# Patient Record
Sex: FEMALE | Race: WHITE | NOT HISPANIC OR LATINO | Employment: PART TIME | URBAN - METROPOLITAN AREA
[De-identification: names, ages, dates, MRNs, and addresses within clinical notes are randomized per-mention and may not be internally consistent; named-entity substitution may affect disease eponyms.]

---

## 2017-12-14 ENCOUNTER — EXTERNAL ORDER RESULTS (OUTPATIENT)
Dept: SURGERY | Facility: CLINIC | Age: 47
End: 2017-12-14

## 2017-12-18 ENCOUNTER — TELEPHONE (OUTPATIENT)
Dept: SURGERY | Facility: CLINIC | Age: 47
End: 2017-12-18

## 2017-12-18 DIAGNOSIS — K55.9 ISCHEMIC COLITIS (H): Primary | ICD-10-CM

## 2017-12-18 NOTE — PATIENT INSTRUCTIONS
CT CHEST ABDOMEN AND PELVIS WITH CONTRAST     Date: 12-20-17  Time: 2:45 PM   Location: [facility]  [address]  [place]  [address]        Please check in at 2:30 PM       Preparation for CT scanning      Do not eat or drink anything TWO hours prior to your exam except for prep:    Mix all the liquid from the bottle Omnipaque 140 (50mL) with 24 ounces of water.     Drink 1/2 of the mixture at:  12:45 PM (two hours before)  Drink the rest of the mixture at:  1:45 PM  (one hour before)    Please bring an updated list of all your medications, including IV Chemo Therapy over the counter and herbal supplements.    For questions regarding your test please call [phone].   If you are taking any medications and you have questions, please call your primary care physician.

## 2017-12-20 ENCOUNTER — HOSPITAL ENCOUNTER (EMERGENCY)
Facility: CLINIC | Age: 47
Discharge: HOME | End: 2017-12-20
Attending: EMERGENCY MEDICINE | Admitting: EMERGENCY MEDICINE
Payer: COMMERCIAL

## 2017-12-20 VITALS
RESPIRATION RATE: 18 BRPM | TEMPERATURE: 98.2 F | WEIGHT: 110 LBS | HEIGHT: 66 IN | OXYGEN SATURATION: 100 % | DIASTOLIC BLOOD PRESSURE: 83 MMHG | BODY MASS INDEX: 17.68 KG/M2 | SYSTOLIC BLOOD PRESSURE: 110 MMHG | HEART RATE: 89 BPM

## 2017-12-20 DIAGNOSIS — R53.1 GENERALIZED WEAKNESS: ICD-10-CM

## 2017-12-20 LAB
ALBUMIN SERPL-MCNC: 4.1 G/DL (ref 3.4–5)
ALBUMIN UR-MCNC: NEGATIVE MG/DL
ALP SERPL-CCNC: 54 U/L (ref 40–150)
ALT SERPL W P-5'-P-CCNC: 24 U/L (ref 0–50)
ANION GAP SERPL CALCULATED.3IONS-SCNC: 8 MMOL/L (ref 3–14)
APPEARANCE UR: ABNORMAL
AST SERPL W P-5'-P-CCNC: 13 U/L (ref 0–45)
BASOPHILS # BLD AUTO: 0.1 10E9/L (ref 0–0.2)
BASOPHILS NFR BLD AUTO: 1.2 %
BILIRUB SERPL-MCNC: 0.5 MG/DL (ref 0.2–1.3)
BILIRUB UR QL STRIP: NEGATIVE
BUN SERPL-MCNC: 11 MG/DL (ref 7–30)
CALCIUM SERPL-MCNC: 9.1 MG/DL (ref 8.5–10.1)
CHLORIDE SERPL-SCNC: 104 MMOL/L (ref 94–109)
CO2 SERPL-SCNC: 27 MMOL/L (ref 20–32)
COLOR UR AUTO: YELLOW
CREAT SERPL-MCNC: 0.76 MG/DL (ref 0.52–1.04)
DIFFERENTIAL METHOD BLD: NORMAL
EOSINOPHIL # BLD AUTO: 0.1 10E9/L (ref 0–0.7)
EOSINOPHIL NFR BLD AUTO: 1.6 %
ERYTHROCYTE [DISTWIDTH] IN BLOOD BY AUTOMATED COUNT: 12.3 % (ref 10–15)
FLUAV+FLUBV AG SPEC QL: NEGATIVE
FLUAV+FLUBV AG SPEC QL: NEGATIVE
GFR SERPL CREATININE-BSD FRML MDRD: 80 ML/MIN/1.7M2
GLUCOSE SERPL-MCNC: 88 MG/DL (ref 70–99)
GLUCOSE UR STRIP-MCNC: NEGATIVE MG/DL
HCT VFR BLD AUTO: 42.3 % (ref 35–47)
HGB BLD-MCNC: 14.5 G/DL (ref 11.7–15.7)
HGB UR QL STRIP: NEGATIVE
IMM GRANULOCYTES # BLD: 0 10E9/L (ref 0–0.4)
IMM GRANULOCYTES NFR BLD: 0.3 %
KETONES UR STRIP-MCNC: NEGATIVE MG/DL
LACTATE BLD-SCNC: 1.1 MMOL/L (ref 0.7–2)
LEUKOCYTE ESTERASE UR QL STRIP: NEGATIVE
LIPASE SERPL-CCNC: 146 U/L (ref 73–393)
LYMPHOCYTES # BLD AUTO: 0.8 10E9/L (ref 0.8–5.3)
LYMPHOCYTES NFR BLD AUTO: 13.7 %
MAGNESIUM SERPL-MCNC: 2.6 MG/DL (ref 1.6–2.3)
MCH RBC QN AUTO: 31 PG (ref 26.5–33)
MCHC RBC AUTO-ENTMCNC: 34.3 G/DL (ref 31.5–36.5)
MCV RBC AUTO: 91 FL (ref 78–100)
MONOCYTES # BLD AUTO: 0.4 10E9/L (ref 0–1.3)
MONOCYTES NFR BLD AUTO: 6.9 %
MUCOUS THREADS #/AREA URNS LPF: PRESENT /LPF
NEUTROPHILS # BLD AUTO: 4.6 10E9/L (ref 1.6–8.3)
NEUTROPHILS NFR BLD AUTO: 76.3 %
NITRATE UR QL: NEGATIVE
NRBC # BLD AUTO: 0 10*3/UL
NRBC BLD AUTO-RTO: 0 /100
PH UR STRIP: 7 PH (ref 5–7)
PLATELET # BLD AUTO: 282 10E9/L (ref 150–450)
POTASSIUM SERPL-SCNC: 4.2 MMOL/L (ref 3.4–5.3)
PROT SERPL-MCNC: 8.2 G/DL (ref 6.8–8.8)
RBC # BLD AUTO: 4.67 10E12/L (ref 3.8–5.2)
RBC #/AREA URNS AUTO: 0 /HPF (ref 0–2)
SODIUM SERPL-SCNC: 139 MMOL/L (ref 133–144)
SOURCE: ABNORMAL
SP GR UR STRIP: 1.01 (ref 1–1.03)
SPECIMEN SOURCE: NORMAL
SQUAMOUS #/AREA URNS AUTO: 1 /HPF (ref 0–1)
TROPONIN I SERPL-MCNC: <0.015 UG/L (ref 0–0.04)
UROBILINOGEN UR STRIP-MCNC: 0 MG/DL (ref 0–2)
WBC # BLD AUTO: 6.1 10E9/L (ref 4–11)
WBC #/AREA URNS AUTO: 0 /HPF (ref 0–2)

## 2017-12-20 PROCEDURE — 96360 HYDRATION IV INFUSION INIT: CPT

## 2017-12-20 PROCEDURE — 83605 ASSAY OF LACTIC ACID: CPT | Performed by: EMERGENCY MEDICINE

## 2017-12-20 PROCEDURE — 80053 COMPREHEN METABOLIC PANEL: CPT | Performed by: EMERGENCY MEDICINE

## 2017-12-20 PROCEDURE — 83735 ASSAY OF MAGNESIUM: CPT | Performed by: EMERGENCY MEDICINE

## 2017-12-20 PROCEDURE — 83690 ASSAY OF LIPASE: CPT | Performed by: EMERGENCY MEDICINE

## 2017-12-20 PROCEDURE — 96361 HYDRATE IV INFUSION ADD-ON: CPT

## 2017-12-20 PROCEDURE — 25000128 H RX IP 250 OP 636: Performed by: EMERGENCY MEDICINE

## 2017-12-20 PROCEDURE — 87804 INFLUENZA ASSAY W/OPTIC: CPT | Performed by: EMERGENCY MEDICINE

## 2017-12-20 PROCEDURE — 93005 ELECTROCARDIOGRAM TRACING: CPT

## 2017-12-20 PROCEDURE — 81001 URINALYSIS AUTO W/SCOPE: CPT | Performed by: EMERGENCY MEDICINE

## 2017-12-20 PROCEDURE — 84484 ASSAY OF TROPONIN QUANT: CPT | Performed by: EMERGENCY MEDICINE

## 2017-12-20 PROCEDURE — 99284 EMERGENCY DEPT VISIT MOD MDM: CPT | Mod: 25

## 2017-12-20 PROCEDURE — 85025 COMPLETE CBC W/AUTO DIFF WBC: CPT | Performed by: EMERGENCY MEDICINE

## 2017-12-20 RX ORDER — SELENIUM 50 MCG
1 TABLET ORAL DAILY
COMMUNITY

## 2017-12-20 RX ADMIN — SODIUM CHLORIDE 1000 ML: 9 INJECTION, SOLUTION INTRAVENOUS at 12:58

## 2017-12-20 ASSESSMENT — ENCOUNTER SYMPTOMS
FEVER: 0
HEADACHES: 0
BLOOD IN STOOL: 0
PALPITATIONS: 0
COUGH: 0
FREQUENCY: 0
DIARRHEA: 0
VOMITING: 0
CONFUSION: 0
WEAKNESS: 0
NAUSEA: 1
FATIGUE: 1
ABDOMINAL PAIN: 0
NUMBNESS: 0
DYSURIA: 0
HEMATURIA: 0
SHORTNESS OF BREATH: 1
DIZZINESS: 0

## 2017-12-20 NOTE — ED PROVIDER NOTES
History     Chief Complaint:  Generalized Weakness; Fatigue    HPI   Alea Davalos is an otherwise healthy 47 year old female who presents for evaluation of generalized weakness and fatigue. The patient underwent colonoscopy on 12/14/2017 at [facility] for evaluation of bloody stools. When she woke up from the procedure she felt dizzy, nauseous, diaphoretic, and developed heart palpitations. She was sent to [facility] ED where she was found to be in atrial flutter. This resolved spontaneously in the ED and she was discharged to home without anticoagulation. Her colonoscopy pathology was consistent with ischemic colitis, so she has been following with both GI and cardiology. The patient reports she has continued to feel nauseous, generally weak, and fatigued since her procedure. Symptoms seemed worse today, prompting her to come to the ED for evaluation. Of note, she is scheduled for CT CAP today with GI as well as stress echo tomorrow for cardiology. On arrival, patient notes nausea, fatigue, and weakness and states she hasn't had much of an appetite. She states she feels fatigued and slightly short of breath with exertion. She denies any fevers, chills, palpitations, chest pain, abdominal pain, headache, urinary symptoms, rash, vision changes, or any further diarrhea or bloody stools. She has been trying to drink fluids at home but hasn't been drinking as much as usual.     Allergies:  No Known Allergies     Medications:    ranitidine (RANITIDINE) 75 MG tablet  Lactobacillus (ACIDOPHILUS) CAPS  calcium-vitamin D (CALTRATE) 600-400 MG-UNIT per tablet  multivitamin, therapeutic with minerals (MULTI-VITAMIN) TABS    Past Medical History:   Migraines    Past Surgical History:    Laparotomy for endometriosis  [place] teeth extraction    Family History:    Prolapsed heart valve  Father CAD in 70s or 80s    Social History:  Smoking status: Never smoker  Alcohol use: Yes, occasional   Presents to the ED  "with her  and sister  Marital Status:   [2]     Review of Systems   Constitutional: Positive for fatigue. Negative for fever.        Generalized weakness   Eyes: Negative for visual disturbance.   Respiratory: Positive for shortness of breath. Negative for cough.    Cardiovascular: Negative for chest pain, palpitations and leg swelling.   Gastrointestinal: Positive for nausea. Negative for abdominal pain, blood in stool, diarrhea and vomiting.   Genitourinary: Negative for dysuria, frequency and hematuria.   Skin: Negative for rash.   Neurological: Negative for dizziness, weakness, numbness and headaches.   Psychiatric/Behavioral: Negative for confusion.   All other systems reviewed and are negative.      [facility]   BP Temp Temp src Pulse Resp SpO2 Height Weight   12/20/17 1430 110/83 - - - - 100 % - -   12/20/17 1415 111/72 - - - - 100 % - -   12/20/17 1345 115/83 - - - - 99 % - -   12/20/17 1300 105/80 - - - - 97 % - -   12/20/17 1131 118/80 98.2  F (36.8  C) Temporal 89 18 96 % 1.676 m (5' 6\") 49.9 kg (110 lb)       Physical Exam  Constitutional: Well developed, nontox appearance  Head: Atraumatic.   Mouth/Throat: Oropharynx is clear and moist.   Neck:  no stridor  Eyes: no scleral icterus  Cardiovascular: RRR, 2+ bilat radial pulses  Pulmonary/Chest: nml resp effort, Clear BS bilat  Abdominal: ND, +BS, soft, NT, no rebound or guarding   : no CVA tenderness bilat  Ext: Warm, well perfused, no edema  Neurological: A&O, symmetric facies, moves ext x4  Skin: Skin is warm and dry.   Psychiatric: Behavior is normal. Thought content normal.   Nursing note and vitals reviewed.    Emergency Department Course   ECG (11:16:27):  Rate 72 bpm. WA interval 112. QRS duration 72. QT/QTc 336/367. P-R-T axes 51 79 -27. Normal sinus rhythm. Nonspecific T wave abnormality. Abnormal ECG   Interpreted at 1130 by Yassine Almodovar MD.    Laboratory:  Troponin: " <0.015  CBC: WNL (WBC 6.1, HGB 14.5, )   CMP: WNL (Creatinine 0.76)  Magnesium: 2.6(H)  Lipase: 146  Lactic acid: 1.1  UA: Mucous present, o/w negative    Interventions:  1258: NS 1L IV Bolus    Emergency Department Course:  Past medical records, nursing notes, and vitals reviewed.  1158: I performed an exam of the patient and obtained history, as documented above.  IV inserted and blood drawn.  ECG obtained, results above.     1357: I rechecked the patient. Explained findings to the patient.    I rechecked the patient.  Findings and plan explained to the Patient. Patient discharged home with instructions regarding supportive care, medications, and reasons to return. The importance of close follow-up was reviewed.   Impression & Plan      Medical Decision Making:  Alea Davalos is a 47 year old female presenting with generalized weakness.     Differential diagnosis includes electrolyte abnormality, dehydration, UTI, influenza, anxiety. Labs ordered as noted above and unremarkable. ECG read as noted above and no evidence of arrhythmia. The patient was given fluids in the Emergency Department. She reports that she basically feels the same. Patient has extensive outpatient testing scheduled for the next few days including CT chest abdomen and pelvis, stress echo, and follow up with her cardiologist in 2 days. I think she is safe for discharge at this point given her vital signs and negative work up.  Doubt ACS, infection, CVA. She is encouraged to continue PO intake to the best of her ability and follow up with her primary care doctor next week. She was counseled on the results, diagnosis, and disposition. She is understanding and agreeable to plan. She is subsequently discharged in stable condition.     Diagnosis:    ICD-10-CM   1. Generalized weakness R53.1     Disposition: Discharged to home    Mary Lou Alonso  12/20/2017   [facility] EMERGENCY DEPARTMENT    Mary Lou SALAZAR am  serving as a scribe at 11:58 AM on 12/20/2017 to document services personally performed by Yassine Almodovar MD based on my observations and the provider's statements to me.        Yassine Almodovar MD  12/21/17 0225

## 2017-12-20 NOTE — ED AVS SNAPSHOT
[facility] Emergency Department    [address]    [facility] [address]    Phone:  [phone]    Fax:  [phone]                                       Alea Davalos   MRN: [id number]    Department:  [facility] Emergency Department   Date of Visit:  12/20/2017           After Visit Summary Signature Page     I have received my discharge instructions, and my questions have been answered. I have discussed any challenges I see with this plan with the nurse or doctor.    ..........................................................................................................................................  Patient/Patient Representative Signature      ..........................................................................................................................................  Patient Representative Print Name and Relationship to Patient    ..................................................               ................................................  Date                                            Time    ..........................................................................................................................................  Reviewed by Signature/Title    ...................................................              ..............................................  Date                                                            Time

## 2017-12-20 NOTE — DISCHARGE INSTRUCTIONS
Please follow-up with your scheduled studies.  Please follow-up with your primary doctor next week as needed  Please follow-up with Cardiology as previously scheduled.  Please return to the ED as needed for new or worsening symptoms such as fainting, difficulty breathing, vomiting and unable to keep anything, fever greater than 100.4 F, any other concerning symptoms.

## 2017-12-20 NOTE — ED NOTES
ABCs intact. Pt had a colonoscopy on 12/14 d/t rectal bleeding. Pt states she was found to have ischemic colitis. Pt states she had an adverse reaction to fentanyl and versed, pt c/o nausea and vomited and pt's hr went into atrial flutter. Pt went to [employer] and her heart rhythm converted itself. Pt has a follow up appointment and a CT chest/abdomen/pelvis today. Pt c/o feeling more weak, decreased intake and appetite since procedure.

## 2017-12-20 NOTE — ED AVS SNAPSHOT
[facility] Emergency Department    [address] [facility] [address]    Phone:  [phone]    Fax:  [phone]                                       Alea Davalos   MRN: [id number]    Department:  [facility] Emergency Department   Date of Visit:  12/20/2017           Patient Information     Date Of Birth          1970        Your diagnoses for this visit were:     Generalized weakness        You were seen by Yassine Almodovar MD.      Follow-up Information     [facility]    Contact information:    [address]          Follow up with [facility] Emergency Department.    Specialty:  EMERGENCY MEDICINE    Why:  As needed    Contact information:    [address]  [facility] [address] [phone]        Discharge Instructions       Please follow-up with your scheduled studies.  Please follow-up with your primary doctor next week as needed  Please follow-up with Cardiology as previously scheduled.  Please return to the ED as needed for new or worsening symptoms such as fainting, difficulty breathing, vomiting and unable to keep anything, fever greater than 100.4 F, any other concerning symptoms.     Future Appointments        Provider Department Dept Phone Center    12/22/2017 8:45 AM Jolly Trinh MD [facility] [phone] [facility]      24 Hour Appointment Hotline       To make an appointment at any [facility], call [phone] ([phone]). If you don't have a family doctor or clinic, we will help you find one. [facility] are conveniently located to serve the needs of you and your family.             Review of your medicines      Our records show that you are taking the medicines listed below. If these are incorrect, please call your family doctor or clinic.        Dose / Directions Last dose taken    acidophilus Caps    Dose:  1 tablet        Take 1 tablet by mouth daily   Refills:  0        calcium-vitamin D 600-400 MG-UNIT per tablet   Commonly known as:  CALTRATE   Dose:  1 tablet        Take 1 tablet by mouth 2 times daily   Refills:  0        Multi-vitamin Tabs tablet   Dose:  1 tablet        Take 1 tablet by mouth daily   Refills:  0        ranitidine 75 MG tablet   Dose:  1 tablet   Generic drug:  ranitidine        Take 1 tablet by mouth as needed   Refills:  0                Procedures and tests performed during your visit     CBC with platelets differential    Comprehensive metabolic panel    EKG 12 lead    Lactic acid whole blood    Lipase    Magnesium    Troponin I    UA with Microscopic      Orders Needing Specimen Collection     Ordered          12/20/17 1354  Influenza A/B antigen - STAT, [facility]: STAT, Needs to be Collected     Scheduled Task Status   12/20/17 1359 Collect Influenza A/B antigen Open   Order Class:  PCU Collect                  Pending Results     No orders found from 12/18/2017 to 12/21/2017.            Pending Culture Results     No orders found from 12/18/2017 to 12/21/2017.            Pending Results Instructions     If you had any lab results that were not finalized at the time of your Discharge, you can call the ED Lab Result RN at [phone]. You will be contacted by this team for any positive Lab results or changes in treatment. The nurses are available 7 days a week from 10A to 6:30P.  You can leave a message 24 hours per day and they will return your call.        Test Results From Your Hospital Stay        12/20/2017 12:51 PM      Component Results     Component Value Ref Range & Units Status    Sodium 139 133 - 144 mmol/L Final    Potassium 4.2 3.4 - 5.3 mmol/L Final    Chloride 104 94 - 109 mmol/L Final    Carbon Dioxide 27 20 - 32 mmol/L Final    Anion Gap 8 3 - 14 mmol/L Final    Glucose 88 70 - 99 mg/dL Final    Urea Nitrogen 11 7 - 30 mg/dL Final    Creatinine 0.76 0.52 - 1.04 mg/dL  Final    GFR Estimate 80 >60 mL/min/1.7m2 Final    Non  GFR Calc    GFR Estimate If Black >90 >60 mL/min/1.7m2 Final    African American GFR Calc    Calcium 9.1 8.5 - 10.1 mg/dL Final    Bilirubin Total 0.5 0.2 - 1.3 mg/dL Final    Albumin 4.1 3.4 - 5.0 g/dL Final    Protein Total 8.2 6.8 - 8.8 g/dL Final    Alkaline Phosphatase 54 40 - 150 U/L Final    ALT 24 0 - 50 U/L Final    AST 13 0 - 45 U/L Final         12/20/2017 12:51 PM      Component Results     Component Value Ref Range & Units Status    Lipase 146 73 - 393 U/L Final         12/20/2017 12:31 PM      Component Results     Component Value Ref Range & Units Status    WBC 6.1 4.0 - 11.0 10e9/L Final    RBC Count 4.67 3.8 - 5.2 10e12/L Final    Hemoglobin 14.5 11.7 - 15.7 g/dL Final    Hematocrit 42.3 35.0 - 47.0 % Final    MCV 91 78 - 100 fl Final    MCH 31.0 26.5 - 33.0 pg Final    MCHC 34.3 31.5 - 36.5 g/dL Final    RDW 12.3 10.0 - 15.0 % Final    Platelet Count 282 150 - 450 10e9/L Final    Diff Method Automated Method  Final    % Neutrophils 76.3 % Final    % Lymphocytes 13.7 % Final    % Monocytes 6.9 % Final    % Eosinophils 1.6 % Final    % Basophils 1.2 % Final    % Immature Granulocytes 0.3 % Final    Nucleated RBCs 0 0 /100 Final    Absolute Neutrophil 4.6 1.6 - 8.3 10e9/L Final    Absolute Lymphocytes 0.8 0.8 - 5.3 10e9/L Final    Absolute Monocytes 0.4 0.0 - 1.3 10e9/L Final    Absolute Eosinophils 0.1 0.0 - 0.7 10e9/L Final    Absolute Basophils 0.1 0.0 - 0.2 10e9/L Final    Abs Immature Granulocytes 0.0 0 - 0.4 10e9/L Final    Absolute Nucleated RBC 0.0  Final         12/20/2017 12:51 PM      Component Results     Component Value Ref Range & Units Status    Troponin I ES <0.015 0.000 - 0.045 ug/L Final    The 99th percentile for upper reference range is 0.045 ug/L.  Troponin values   in the range of 0.045 - 0.120 ug/L may be associated with risks of adverse   clinical events.           12/20/2017  1:20 PM      Component Results      Component Value Ref Range & Units Status    Color Urine Yellow  Final    Appearance Urine Slightly Cloudy  Final    Glucose Urine Negative NEG^Negative mg/dL Final    Bilirubin Urine Negative NEG^Negative Final    Ketones Urine Negative NEG^Negative mg/dL Final    Specific Gravity Urine 1.013 1.003 - 1.035 Final    Blood Urine Negative NEG^Negative Final    pH Urine 7.0 5.0 - 7.0 pH Final    Protein Albumin Urine Negative NEG^Negative mg/dL Final    Urobilinogen mg/dL 0.0 0.0 - 2.0 mg/dL Final    Nitrite Urine Negative NEG^Negative Final    Leukocyte Esterase Urine Negative NEG^Negative Final    Source Midstream Urine  Final    WBC Urine 0 0 - 2 /HPF Final    RBC Urine 0 0 - 2 /HPF Final    Squamous Epithelial /HPF Urine 1 0 - 1 /HPF Final    Mucous Urine Present (A) NEG^Negative /LPF Final         12/20/2017 12:51 PM      Component Results     Component Value Ref Range & Units Status    Magnesium 2.6 (H) 1.6 - 2.3 mg/dL Final         12/20/2017 12:31 PM      Component Results     Component Value Ref Range & Units Status    Lactic Acid 1.1 0.7 - 2.0 mmol/L Final                Clinical Quality Measure: Blood Pressure Screening     Your blood pressure was checked while you were in the emergency department today. The last reading we obtained was  BP: 115/83 . Please read the guidelines below about what these numbers mean and what you should do about them.  If your systolic blood pressure (the top number) is less than 120 and your diastolic blood pressure (the bottom number) is less than 80, then your blood pressure is normal. There is nothing more that you need to do about it.  If your systolic blood pressure (the top number) is 120-139 or your diastolic blood pressure (the bottom number) is 80-89, your blood pressure may be higher than it should be. You should have your blood pressure rechecked within a year by a primary care provider.  If your systolic blood pressure (the top number) is 140 or greater or your  "diastolic blood pressure (the bottom number) is 90 or greater, you may have high blood pressure. High blood pressure is treatable, but if left untreated over time it can put you at risk for heart attack, stroke, or kidney failure. You should have your blood pressure rechecked by a primary care provider within the next 4 weeks.  If your provider in the emergency department today gave you specific instructions to follow-up with your doctor or provider even sooner than that, you should follow that instruction and not wait for up to 4 weeks for your follow-up visit.        Thank you for choosing [facility]       Thank you for choosing [facility] for your care. Our goal is always to provide you with excellent care. Hearing back from our patients is one way we can continue to improve our services. Please take a few minutes to complete the written survey that you may receive in the mail after you visit with us. Thank you!        [employer] Information     [employer] lets you send messages to your doctor, view your test results, renew your prescriptions, schedule appointments and more. To sign up, go to [email or web address] . Click on \"Log in\" on the left side of the screen, which will take you to the Welcome page. Then click on \"Sign up Now\" on the right side of the page.     You will be asked to enter the access code listed below, as well as some personal information. Please follow the directions to create your username and password.     Your access code is: [email or web address]  Expires: 3/20/2018  2:32 PM     Your access code will  in 90 days. If you need help or a new code, please call your [facility] clinic or [phone].        Care EveryWhere ID     This is your Care EveryWhere ID. This could be used by other organizations to access your [facility] medical records  [id number]        Equal Access to Services     TEJINDER WEBB AH: khari Rizvi, naldo freitas " dwight serrano ah. So [facility] [phone].    ATENCIÓN: Si habla español, tiene a lees disposición servicios gratuitos de asistencia lingüística. Llame al [phone].    We comply with applicable federal civil rights laws and [place] laws. We do not discriminate on the basis of race, color, national origin, age, disability, sex, sexual orientation, or gender identity.            After Visit Summary       This is your record. Keep this with you and show to your community pharmacist(s) and doctor(s) at your next visit.

## 2017-12-21 ENCOUNTER — HOSPITAL ENCOUNTER (OUTPATIENT)
Dept: CT IMAGING | Facility: CLINIC | Age: 47
Discharge: HOME | End: 2017-12-21
Attending: SURGERY | Admitting: SURGERY
Payer: COMMERCIAL

## 2017-12-21 DIAGNOSIS — K55.9 ISCHEMIC COLITIS (H): ICD-10-CM

## 2017-12-21 LAB — INTERPRETATION ECG - MUSE: NORMAL

## 2017-12-21 PROCEDURE — 74177 CT ABD & PELVIS W/CONTRAST: CPT

## 2017-12-21 PROCEDURE — 71260 CT THORAX DX C+: CPT

## 2017-12-21 PROCEDURE — 25000128 H RX IP 250 OP 636: Performed by: RADIOLOGY

## 2017-12-21 RX ORDER — IOPAMIDOL 755 MG/ML
500 INJECTION, SOLUTION INTRAVASCULAR ONCE
Status: COMPLETED | OUTPATIENT
Start: 2017-12-21 | End: 2017-12-21

## 2017-12-21 RX ADMIN — SODIUM CHLORIDE 54 ML: 9 INJECTION, SOLUTION INTRAVENOUS at 08:40

## 2017-12-21 RX ADMIN — IOPAMIDOL 54 ML: 755 INJECTION, SOLUTION INTRAVENOUS at 08:40

## 2017-12-22 ENCOUNTER — OFFICE VISIT (OUTPATIENT)
Dept: CARDIOLOGY | Facility: CLINIC | Age: 47
End: 2017-12-22
Payer: COMMERCIAL

## 2017-12-22 VITALS
SYSTOLIC BLOOD PRESSURE: 93 MMHG | DIASTOLIC BLOOD PRESSURE: 61 MMHG | HEIGHT: 66 IN | OXYGEN SATURATION: 94 % | HEART RATE: 89 BPM | BODY MASS INDEX: 17.68 KG/M2 | WEIGHT: 110 LBS

## 2017-12-22 DIAGNOSIS — I48.92 ATRIAL FLUTTER, PAROXYSMAL (H): Primary | ICD-10-CM

## 2017-12-22 PROCEDURE — 99204 OFFICE O/P NEW MOD 45 MIN: CPT | Performed by: INTERNAL MEDICINE

## 2017-12-22 PROCEDURE — 93000 ELECTROCARDIOGRAM COMPLETE: CPT | Performed by: INTERNAL MEDICINE

## 2017-12-22 NOTE — PROGRESS NOTES
[facility] HEART CARE [facility]      PRIMARY CARE PROVIDER:  Kat Warner MD       REASON FOR VISIT:  New diagnosis of atrial flutter in the context of ischemic colitis.      HISTORY OF PRESENT ILLNESS:    The patient is an absolutely delightful 47-year-old perimenopausal  lady who was accompanied by her , Asif, today.  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  She does not have any significant baseline medical history, is a lifelong never tobacco user and is small built with a BMI of 17.8 kg/4 m2.      1. Atrial flutter    Approximately 2 weeks ago, patient had abdominal discomfort with rectal bleeding.  To evaluate this, she underwent a colonoscopy on 12/14/2017.  For sedation, she had received fentanyl and midazolam.  During recovery, she had significant nausea, vomiting, dizziness and in this context developed tachy palpitations with heart rate of 120 BPM and ECG confirmed atrial flutter with a 3:1 block (typical flutter), rate 120 BPM, nonspecific ST-T changes.  Troponin was negative.  She went to the [facility] emergency room.  ECG at 1755 on 12/14 showed atrial flutter, but she self-converted to sinus rhythm at 1834 on the same day.        Colonoscopy and CT findings were consistent with ischemic colitis and she is recovering well.  She did not have significant drop in hemoglobin and has not required a blood transfusion or antibiotic therapy.      She has not had recurrence of tachy palpitations before or since.  There is no background medical history. Family history is significant for coronary stents in father in his 70s (ex-smoker), strokes and grandmother's in their late 70s/early 80s. The patient drinks 0-1 servings of caffeine daily, rare alcohol, walks regularly, has never used tobacco products, no over-the-counter herbal supplements or energy drinks.      Hemoglobin is 14.5, electrolytes normal, TSH normal, lipid profile is excellent.  She had a negative exercise  echocardiogram yesterday (bicycle, 7.7 METs) with a peak heart rate of 130 BPM, no arrhythmias, no ischemic changes, no valve disease.      MEDICATIONS:   1.  Ranitidine 75 mg as needed.     2.  Lactobacillus.   2.  Calcium with vitamin D supplement.   3.  Daily multivitamin.      ALLERGIES:  No known allergies.          Please see my attached note in Epic for comprehensive review of systems, medications, past medical an surgical history, social history and family history.        PHYSICAL EXAMINATION:   VITAL SIGNS:  Blood pressure 93/61, pulse 89 per minute and regular, height 1.676 meters, weight 49.9 kg (110 pounds), respiratory rate 14 per minute, saturations 94% on room air, BMI 17.8 kg/4 m2.   Please see the attached note in Epic for a detailed   CONSTITUTIONAL:  Slim body habitus, comfortable at rest.  Alert, oriented x3.   CARDIOVASCULAR:  Normal with normal heart sounds, no murmur or bruit.   RESPIRATORY:  Normal breath sounds.   ABDOMEN:  Soft, nontender, without bruit.  Active bowel sounds.   EXTREMITIES:  No edema.      DIAGNOSTIC DATA:  I reviewed her serial ECGs, CT abdomen report, exercise echocardiogram report (Care Everywhere) and labs.  As documented in HPI.      DIAGNOSES:     1.  Paroxysmal atrial flutter in the context of ischemic colitis.   2.  Counseling regarding hormone replacement therapy.      ASSESSMENT AND PLAN:   1.  I reviewed the diagnosis of atrial flutter with the help of online animation.  This is her first ever episode.  I suspect that it occurred in the perfect storm of nausea, vomiting, abdominal discomfort.  The retching probably slowed down her AV node, thereby activating the atrial flutter circuit.  In any case, she spontaneously converted to sinus rhythm.  She has a very healthy lifestyle with minimal to no caffeine or alcohol, exercises regularly.  Thyroid function is normal.  I would not recommend any preemptive beta blocker, etc., because of her low resting blood  pressure.  If this becomes a recurrent problem, she would be an ideal candidate for catheter ablation which has a success rate of approximately 95% for typical flutter. Her symptoms started with abdominal discomfort, and her CHADS-Vasc score is zero. It is highly unlikey that she had a thromboembolic event precipitating the ischemic colitis.  For now, she does not need to routinely follow up with me.  I will be happy to see her as needed and refer to Electrophysiology.       2.  She inquired hormone replacement therapy.  We had a detailed discussion about it.  It is not indicated as a preventive strategy for decreasing cardiovascular risk.  Certainly for menopausal symptoms, it can be taken at the least possible dose for the least amount of time.  The additional questions and prescription for this will be deferred to her primary provider.      It was my pleasure to visit with this patient.         KALPESH ALANIZ MD             D: 2017 09:59   T: 2017 10:46   MT: RESHMA      Name:     NAY FELIPE   MRN:      [id number]        Account:      [id number]   :      1970           Service Date: 2017      Document: [id number]

## 2017-12-22 NOTE — LETTER
12/22/2017      Kat Warner MD  [facility] [address] [facility] [address] [place] [address]    RE: Alea MEET Davalos     Dear Colleague,    I had the pleasure of seeing Alea Davalos in the [facility].    [facility] HEART CARE [facility]      PRIMARY CARE PROVIDER:  Kat Warner MD       REASON FOR VISIT:  New diagnosis of atrial flutter in the context of ischemic colitis.      The patient is an absolutely delightful 47-year-old perimenopausal  lady who was accompanied by her , Asif, today.  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  She does not have any significant baseline medical history, is a lifelong never tobacco user and is small built with a BMI of 17.8 kg/4 m2.      1. Atrial flutter    Approximately 2 weeks ago, patient had abdominal discomfort with rectal bleeding.  To evaluate this, she underwent a colonoscopy on 12/14/2017.  For sedation, she had received fentanyl and midazolam.  During recovery, she had significant nausea, vomiting, dizziness and in this context developed tachy palpitations with heart rate of 120 BPM and ECG confirmed atrial flutter with a 3:1 block (typical flutter), rate 120 BPM, nonspecific ST-T changes.  Troponin was negative.  She went to the [facility] emergency room.  ECG at 1755 on 12/14 showed atrial flutter, but she self-converted to sinus rhythm at 1834 on the same day.        Colonoscopy and CT findings were consistent with ischemic colitis and she is recovering well.  She did not have significant drop in hemoglobin and has not required a blood transfusion or antibiotic therapy.            She has not had recurrence of tachy palpitations before or since.  There is no background medical history. Family history is significant for coronary stents in father in his 70s (ex-smoker), strokes and grandmother's in their late 70s/early 80s. The patient drinks 0-1 servings of  caffeine daily, rare alcohol, walks regularly, has never used tobacco products, no over-the-counter herbal supplements or energy drinks.      Hemoglobin is 14.5, electrolytes normal, TSH normal, lipid profile is excellent.  She had a negative exercise echocardiogram yesterday (bicycle, 7.7 METs) with a peak heart rate of 130 BPM, no arrhythmias, no ischemic changes, no valve disease.      MEDICATIONS:   1.  Ranitidine 75 mg as needed.     2.  Lactobacillus.   2.  Calcium with vitamin D supplement.   3.  Daily multivitamin.      ALLERGIES:  No known allergies.          Please see my attached note in Epic for comprehensive review of systems, medications, past medical an surgical history, social history and family history.        PHYSICAL EXAMINATION:   VITAL SIGNS:  Blood pressure 93/61, pulse 89 per minute and regular, height 1.676 meters, weight 49.9 kg (110 pounds), respiratory rate 14 per minute, saturations 94% on room air, BMI 17.8 kg/4 m2.   Please see the attached note in Epic for a detailed   CONSTITUTIONAL:  Slim body habitus, comfortable at rest.  Alert, oriented x3.   CARDIOVASCULAR:  Normal with normal heart sounds, no murmur or bruit.   RESPIRATORY:  Normal breath sounds.   ABDOMEN:  Soft, nontender, without bruit.  Active bowel sounds.   EXTREMITIES:  No edema.      DIAGNOSTIC DATA:  I reviewed her serial ECGs, CT abdomen report, exercise echocardiogram report (Care Everywhere) and labs.  As documented in HPI.      DIAGNOSES:     1.  Paroxysmal atrial flutter in the context of ischemic colitis.   2.  Counseling regarding hormone replacement therapy.      ASSESSMENT AND PLAN:   1.  I reviewed the diagnosis of atrial flutter with the help of online animation.  This is her first ever episode.  I suspect that it occurred in the perfect storm of nausea, vomiting, abdominal discomfort.  The retching probably slowed down her AV node, thereby activating the atrial flutter circuit.  In any case, she  spontaneously converted to sinus rhythm.  She has a very healthy lifestyle with minimal to no caffeine or alcohol, exercises regularly.  Thyroid function is normal.  I would not recommend any preemptive beta blocker, etc., because of her low resting blood pressure.  If this becomes a recurrent problem, she would be an ideal candidate for catheter ablation which has a success rate of approximately 95% for typical flutter. Her symptoms started with abdominal discomfort, and her CHADS-Vasc score is zero. It is highly unlikey that she had a thromboembolic event precipitating the ischemic colitis.  For now, she does not need to routinely follow up with me.  I will be happy to see her as needed and refer to Electrophysiology.           2.  She inquired hormone replacement therapy.  We had a detailed discussion about it.  It is not indicated as a preventive strategy for decreasing cardiovascular risk.  Certainly for menopausal symptoms, it can be taken at the least possible dose for the least amount of time.  The additional questions and prescription for this will be deferred to her primary provider.      It was my pleasure to visit with this patient.      Outpatient Encounter Prescriptions as of 12/22/2017   Medication Sig Dispense Refill     ranitidine (RANITIDINE) 75 MG tablet Take 1 tablet by mouth as needed       Lactobacillus (ACIDOPHILUS) CAPS Take 1 tablet by mouth daily       calcium-vitamin D (CALTRATE) 600-400 MG-UNIT per tablet Take 1 tablet by mouth 2 times daily       multivitamin, therapeutic with minerals (MULTI-VITAMIN) TABS Take 1 tablet by mouth daily       No facility-administered encounter medications on file as of 12/22/2017.      Again, thank you for allowing me to participate in the care of your patient.      Sincerely,    oJlly Trinh MD     [facility]

## 2017-12-22 NOTE — PROGRESS NOTES
Dictation reference number - [id number]    REFERRING PROVIDER:  No referring provider defined for this encounter.    PRIMARY CARE PROVIDER:  Bergeron, Wendy M [facility] [address] [facility] [address] [facility] [address]        REVIEW OF SYSTEMS:  A comprehensive 10-point review of systems was completed and the pertinent positives are documented in the history of present illness.    Skin:  Negative     Eyes:  Negative    ENT:  Negative    Respiratory:  Negative    Cardiovascular:    fatigue;lightheadedness;Positive for  Gastroenterology: Positive for    Genitourinary:  Negative    Musculoskeletal:  Negative    Neurologic:  Positive for migraine headaches;numbness or tingling of feet  Psychiatric:  Positive for sleep disturbances  Heme/Lymph/Imm:  Negative    Endocrine:  Negative      CURRENT MEDICATIONS:  Current Outpatient Prescriptions   Medication Sig Dispense Refill     ranitidine (RANITIDINE) 75 MG tablet Take 1 tablet by mouth as needed       Lactobacillus (ACIDOPHILUS) CAPS Take 1 tablet by mouth daily       calcium-vitamin D (CALTRATE) 600-400 MG-UNIT per tablet Take 1 tablet by mouth 2 times daily       multivitamin, therapeutic with minerals (MULTI-VITAMIN) TABS Take 1 tablet by mouth daily           ALLERGIES:  No Known Allergies    PAST MEDICAL HISTORY:    History reviewed. No pertinent past medical history.    PAST SURGICAL HISTORY:    History reviewed. No pertinent surgical history.    FAMILY HISTORY:    Family History   Problem Relation Age of Onset     Thyroid Disease Mother      Coronary Artery Disease Father        SOCIAL HISTORY:    Social History     Social History     Marital status:      Spouse name: N/A     Number of children: N/A     Years of education: N/A     Social History Main Topics     Smoking status: Never Smoker     Smokeless tobacco: Never Used     Alcohol use Yes      Comment: occ     Drug use: No     Sexual activity: Not Asked     Other Topics Concern     Parent/Sibling  "W/ Cabg, Mi Or Angioplasty Before 65f 55m? No     Social History Narrative       PHYSICAL EXAM:    Vitals: BP 93/61  Pulse 89  Ht 1.676 m (5' 6\")  Wt 49.9 kg (110 lb)  SpO2 94%  BMI 17.75 kg/m2  Wt Readings from Last 5 Encounters:   12/22/17 49.9 kg (110 lb)   12/20/17 49.9 kg (110 lb)   12/26/14 52.2 kg (115 lb)       Constitutional: Comfortable at rest. Cooperative, alert and oriented, well developed, well nourished.  Eyes: Pupils equal and round, conjunctivae and lids unremarkable, sclera white, no xanthalasma,   ENT: Satisfactory dentition.  No cyanosis or pallor.  Neck: Carotid pulses are full and equal bilaterally, no carotid bruit, no thyromegaly     Respiratory: Normal respiratory effort with symmetrical chest wall movements and no use of accessory muscles. Good air entry with normal breath sounds and no rales or wheeze.  Cardiovascular: Normal jugular venous pulse and pressure.  Normal carotid pulse character and volume.  No carotid bruit.  Apical impulse is undisplaced and normal to palpation without parasternal heave or thrill.  Heart sounds are normal and regular. No audible murmur. No S3, S4 or friction rub.    GI: Soft, nontender, no hepatosplenomegaly, no masses, active bowel sounds.    Skin: No rash, erythema, ecchymosis.  Musculoskeletal: Normal muscle tone and strength. Normal gait. No spinal deformities.  Neuropsychiatric: Oriented to time place and person.  Affect normal.  No gross motor deficits.  Extremities: No edema. No clubbing or deformities.      Encounter Diagnosis   Name Primary?     Atrial flutter, paroxysmal (H) Yes       Orders Placed This Encounter   Procedures     EKG 12-lead complete w/read - Clinics (performed today)       CC  REFERRING PROVIDER:  No referring provider defined for this encounter.                "

## 2017-12-22 NOTE — MR AVS SNAPSHOT
"              After Visit Summary   12/22/2017    Alea Davalos    MRN: [id number]           Patient Information     Date Of Birth          1970        Visit Information        Provider Department      12/22/2017 8:45 AM Jolly Trinh MD [facility]        Today's Diagnoses     Atrial flutter, paroxysmal (H)    -  1      Care Instructions    1. No additional cardiology testing or follow up. See you as needed in Cardiology.    If you have any questions or concerns, please call my nurse Renata Hughes at [phone].          Follow-ups after your visit        Who to contact     If you have questions or need follow up information about today's clinic visit or your schedule please contact [facility] directly at [phone].  Normal or non-critical lab and imaging results will be communicated to you by Lytrohart, letter or phone within 4 business days after the clinic has received the results. If you do not hear from us within 7 days, please contact the clinic through Lytrohart or phone. If you have a critical or abnormal lab result, we will notify you by phone as soon as possible.  Submit refill requests through [employer] or call your pharmacy and they will forward the refill request to us. Please allow 3 business days for your refill to be completed.          Additional Information About Your Visit        Lytrohart Information     [employer] lets you send messages to your doctor, view your test results, renew your prescriptions, schedule appointments and more. To sign up, go to [email or web address] . Click on \"Log in\" on the left side of the screen, which will take you to the Welcome page. Then click on \"Sign up Now\" on the right side of the page.     You will be asked to enter the access code listed below, as well as some personal information. Please follow the directions to create your username and password.     Your " "access code is: [email or web address]  Expires: 3/20/2018  2:32 PM     Your access code will  in 90 days. If you need help or a new code, please call your [facility] clinic or [phone].        Care EveryWhere ID     This is your Care EveryWhere ID. This could be used by other organizations to access your [facility] medical records  [id number]        Your Vitals Were     Pulse Height Pulse Oximetry BMI (Body Mass Index)          89 1.676 m (5' 6\") 94% 17.75 kg/m2         Blood Pressure from Last 3 Encounters:   17 93/61   17 110/83   14 118/80    Weight from Last 3 Encounters:   17 49.9 kg (110 lb)   17 49.9 kg (110 lb)   14 52.2 kg (115 lb)              We Performed the Following     EKG 12-lead complete w/read - Clinics (performed today)        Primary Care Provider Office Phone # Fax #    Kat Warner [phone]       [facility] [address] [place] [address]        Equal Access to Services     TEJINDER WEBB AH: Hadii elvis ku hadasho Soomaali, waaxda luqadaha, qaybta kaalmada adeegyada, naldo blake. So [facility] [phone].    ATENCIÓN: Si habla español, tiene a lees disposición servicios gratuitos de asistencia lingüística. Llame al [phone].    We comply with applicable federal civil rights laws and [place] laws. We do not discriminate on the basis of race, color, national origin, age, disability, sex, sexual orientation, or gender identity.            Thank you!     Thank you for choosing [facility] HEART [facility]  for your care. Our goal is always to provide you with excellent care. Hearing back from our patients is one way we can continue to improve our services. Please take a few minutes to complete the written survey that you may receive in the mail after your visit with us. Thank you!             Your Updated Medication List - Protect others around you: Learn how to safely " use, store and throw away your medicines at www.disposemymeds.org.          This list is accurate as of: 12/22/17  9:48 AM.  Always use your most recent med list.                   Brand Name Dispense Instructions for use Diagnosis    acidophilus Caps      Take 1 tablet by mouth daily        calcium-vitamin D 600-400 MG-UNIT per tablet    CALTRATE     Take 1 tablet by mouth 2 times daily        Multi-vitamin Tabs tablet      Take 1 tablet by mouth daily        ranitidine 75 MG tablet   Generic drug:  ranitidine      Take 1 tablet by mouth as needed

## 2017-12-22 NOTE — PATIENT INSTRUCTIONS
1. No additional cardiology testing or follow up. See you as needed in Cardiology.    If you have any questions or concerns, please call my nurse Renata Hughes at [phone].

## 2022-07-21 ENCOUNTER — TELEPHONE (OUTPATIENT)
Dept: OBGYN | Facility: CLINIC | Age: 52
End: 2022-07-21

## 2024-09-18 ENCOUNTER — HOSPITAL ENCOUNTER (OUTPATIENT)
Dept: MAMMOGRAPHY | Facility: CLINIC | Age: 54
Discharge: HOME | End: 2024-09-18
Attending: OBSTETRICS & GYNECOLOGY | Admitting: OBSTETRICS & GYNECOLOGY
Payer: COMMERCIAL

## 2024-09-18 DIAGNOSIS — Z12.31 VISIT FOR SCREENING MAMMOGRAM: ICD-10-CM

## 2024-09-18 PROCEDURE — 77063 BREAST TOMOSYNTHESIS BI: CPT
